# Patient Record
Sex: FEMALE | Race: WHITE | NOT HISPANIC OR LATINO | Employment: PART TIME | URBAN - METROPOLITAN AREA
[De-identification: names, ages, dates, MRNs, and addresses within clinical notes are randomized per-mention and may not be internally consistent; named-entity substitution may affect disease eponyms.]

---

## 2022-08-24 ENCOUNTER — TRANSFERRED RECORDS (OUTPATIENT)
Dept: HEALTH INFORMATION MANAGEMENT | Facility: CLINIC | Age: 37
End: 2022-08-24

## 2022-08-25 ENCOUNTER — TRANSFERRED RECORDS (OUTPATIENT)
Dept: HEALTH INFORMATION MANAGEMENT | Facility: CLINIC | Age: 37
End: 2022-08-25

## 2022-10-05 ENCOUNTER — TRANSFERRED RECORDS (OUTPATIENT)
Dept: HEALTH INFORMATION MANAGEMENT | Facility: CLINIC | Age: 37
End: 2022-10-05

## 2023-03-06 ENCOUNTER — TRANSFERRED RECORDS (OUTPATIENT)
Dept: HEALTH INFORMATION MANAGEMENT | Facility: CLINIC | Age: 38
End: 2023-03-06

## 2023-03-09 ENCOUNTER — TRANSFERRED RECORDS (OUTPATIENT)
Dept: HEALTH INFORMATION MANAGEMENT | Facility: CLINIC | Age: 38
End: 2023-03-09

## 2023-04-06 ENCOUNTER — TRANSFERRED RECORDS (OUTPATIENT)
Dept: HEALTH INFORMATION MANAGEMENT | Facility: CLINIC | Age: 38
End: 2023-04-06

## 2023-04-21 ENCOUNTER — REFERRAL (OUTPATIENT)
Dept: TRANSPLANT | Facility: CLINIC | Age: 38
End: 2023-04-21

## 2023-04-21 DIAGNOSIS — K76.6 PORTAL HYPERTENSION (H): ICD-10-CM

## 2023-04-21 DIAGNOSIS — K70.31 ALCOHOLIC CIRRHOSIS OF LIVER WITH ASCITES (H): Primary | ICD-10-CM

## 2023-04-21 NOTE — LETTER
Chioma VENKAT Alvarado  [address] [place] [address]                May 2, 2023          MEDICAL RECORDS REQUEST  [facility] liver transplant team is requesting records from Referring Providers Office for patients referred to the Liver Transplant Program                Facility: [facility]    Images Needed to Process Intake of Patient:  CXR Images (most recent)  Chest CT Images (all within last 24 months or most recent)  Abdominal CT Report and Images  (all within last 24 months or most recent)  Abdominal MRI Report and Images  (all within last 24 months or most recent)  Bone Scan Images and Report (No DEXA Scans)  PET Images and Report  Records Needed to Process Intake of Patient:  Cardiac Catheterization Results (most recent on file)  Chest CT Report (all within last 24 months or most recent)  Hospital Discharge Summaries (last 2 years on file)  Lab Results (most recent on file)  History and Physical (original on file)  Liver Pathology All Reports   Physicians Notes (last 3 reports on file)  Radiology Reports (not including Chest X-ray, last 2 years on file)  Colonoscopy Report with Pathology    Requested pathology slides should be accompanied by the appropriate report from your institution.    When the patient is hand carrying requested records or the requested records are not at your facility, please indicate this information on a return faxed cover sheet.    Please fax all paper records to [phone] within 1-3 business days.    Please send all scans/slides to:   [facility]  Solid Organ Transplant Office  [address] [place] [address] [address]  [place] [address]    Please call our office at [phone] if you have any questions or concerns.

## 2023-04-21 NOTE — LETTER
September 14, 2023    Chioma Preciadofarzaneh  [address] [facility] [address]    Dear Ms. Alvarado,   The purpose of this letter is to let you know that we will be closing your referral at this time. This is because we are not able to reach you (your phone number on file with us and your referring GI is out of service) and you have not followed up with your appointments as discussed.   Important things you should know:  If you would like to discuss the decision, or if your medical status changes you may schedule a return visits with your doctor by calling [phone] and asking to speak to your transplant coordinator.  We recommend that you continue to follow up with your primary care and referring GI doctor in order to manage your health concerns.  If you are interesting is re-establishing with us, please call your coordinator (see below) to discuss.   We want you to know that our program has physician and surgeon coverage 24 hours a day, 365 days a year. Attached is a letter from [facility] that describes the services and information offered to patients by [facility] and the Organ Procurement and Transplantation Network (OPTN).  Thank you for allowing us to participate in your care.  We wish you well.  Sincerely,    Kj Garcia Jr., SON, RN  Liver Transplant Coordinator  [phone]  Solid Organ Transplant  [facility]    Enclosures: OS Letter  cc: Care Team    The Organ Procurement and Transplantation Network Toll-free patient services line:  [phone]  Your resource for organ transplant information    Staffed 8:30 am - 5:00 pm ET Monday - Friday Leave a message 24/7 to receive a call back    The Organ Procurement and Transplantation Network (OPTN) is the national transplant system. It makes the policies that decide how donated organs are matched to patients waiting for a transplant. The OPTN:    Makes sure donated organs get matched to people on the transplant  waiting list  Tells people about the donation and transplant processes  Makes sure that the public knows about the need for more organ and tissue donations    The OPTN has a free patient services line that you can call to:  Get more information about:  Organ donation and organ transplants  Donation and transplant policies  Get an information kit with:  A list of transplant hospitals  Waiting list information  Talk about any questions you may have about your transplant hospital or organ procurement organization. The staff will do their best to help you or point you to others who may help.  Find out how you can volunteer with the OPTN and help shape transplant policy     The patient services line number is: 5-678-069-4406    Patient services line staff CANNOT answer questions about your own medical care, including:  Waiting list status  Test results  Medical records  You will need to call your transplant hospital for this information.    The following websites have more information about transplantation and donation:    OPTN: https://optn.transplant.hrsa.gov/    For potential living donors and transplant recipients:    Living with transplant: https://www.transplantliving.org/    Living donation process: https://optn.transplant.hrsa.gov/living-donation/    Financial assistance: https://www.livingdonorassistance.org/    Transplantation data: https://www.srtr.org/    Organ donation: https://www.organdonor.gov/    Volunteer with the OPTN: https://optn.transplant.hrsa.gov/get-involved/

## 2023-04-21 NOTE — LETTER
PHYSICIAN ORDERS      DATE & TIME ISSUED: May 11, 2023 11:50 AM  PATIENT NAME: Chioma Alvarado   : 1985     [facility] MR# : [id number]     DIAGNOSIS:  cirrhosis  ICD-10 CODE: K70.31   Orders  1 year after issue.  Please draw once, 3 days before virtual appt w/ Dr. Chavarria (date/time TBD)  These labs must be drawn all together on the same day when done:               INR               Hepatic panel               BMP               CBC w/ platelets               Phosphatidylethanol (PEth)     PLEASE FAX RESULTS AS SOON AS POSSIBLE TO [phone], ATTN:LabDE    FOR CLINICAL QUESTIONS, PLEASE CALL Zion Garcia RN at [phone]    .  Hepatology/Liver Transplant  Medical Director, Liver Transplantation  [facility]

## 2023-04-21 NOTE — LETTER
May 2, 2023     Chioma Alvarado  [address]  [place] [address]          Dear Chioma,    Thank you for your interest in the Transplant Center at [facility]. We look forward to being a part of your care team and assisting you through the transplant process.    As we discussed, your transplant coordinator is Kj Garcia Jr. (Liver).  You may call your coordinator at any time with questions or concerns.  Your first scheduled call will be on 05/11/2023 between 11:00 am 12:00 pm. If you need to reschedule, please call [phone].    Please complete the following.    Fill out and return the enclosed forms  Authorization for Electronic Communication  Authorization to Discuss Protected Health Information  Authorization for Release of Protected Health Information    Sign up for:  "[employer]t, access to your electronic medical record (see enclosed pamphlet)  [employer], a transplant education website    You can use these tools to learn more about your transplant, communicate with your care team, and track your medical details      Sincerely,      Solid Organ Transplant  [facility]    cc: Referring Physician

## 2023-04-25 ENCOUNTER — TRANSFERRED RECORDS (OUTPATIENT)
Dept: HEALTH INFORMATION MANAGEMENT | Facility: CLINIC | Age: 38
End: 2023-04-25
Payer: COMMERCIAL

## 2023-04-26 ENCOUNTER — TRANSFERRED RECORDS (OUTPATIENT)
Dept: HEALTH INFORMATION MANAGEMENT | Facility: CLINIC | Age: 38
End: 2023-04-26
Payer: COMMERCIAL

## 2023-05-02 VITALS — HEIGHT: 67 IN | WEIGHT: 128 LBS | BODY MASS INDEX: 20.09 KG/M2

## 2023-05-02 NOTE — TELEPHONE ENCOUNTER
Patient was asked the following questions during liver intake call.     Referring Provider: PRASAD Koo   Referring Diagnosis: Alcoholic cirrhosis of the liver with ascites   PCP: Patient states same as referring provider     1)Do you know why you have liver disease: Yes       If Alcoholic Cirrhosis is present when was your last drink: 12/2022       Have you ever been through treatment for alcohol: Yes, [facility] 2008 and 2009 in [place], [place].  2) Presence of Ascites: Yes  Paracentesis: Yes   3) Presence of Hepatic Encephalopathy: No  Medications: Nadolol and lasix    4) History of GI Bleeding: No  5) Oxygen Use: No  6) EGD: Yes   7) Colonoscopy: No   8) MELD Score: 22 (03/15/2023)  9) Labs available for review from PCP/GI: Most labs available and scanned from outside   10)HCC Diagnosis: N/A        11)Insurance information:                Policy [place]: self              Subscriber/Policy/ID Number: [facility] Medicaid ID# [id number]             Group Number: [id number]    Referral intake process completed.  Patient is aware that after financial approval is received, medical records will be requested.   Patient confirmed for a callback from transplant coordinator on 05/11/2023.  Tentative evaluation date TBD.    Confirmed coordinator will discuss evaluation process in more detail at the time of their call.   Patient is aware of the need to arrange age appropriate cancer screening, vaccinations, and dental care.    Reminded patient to complete questionnaire, complete medical records release, and review packet prior to evaluation visit .  Assessed patient for special needs (ie--wheelchair, assistance, guardian, and ): None  Patient instructed to call [phone] with questions.     Patient gave verbal consent during intake call to obtain medical records and documents outside of [facility]: Yes

## 2023-05-05 ENCOUNTER — TRANSFERRED RECORDS (OUTPATIENT)
Dept: HEALTH INFORMATION MANAGEMENT | Facility: CLINIC | Age: 38
End: 2023-05-05

## 2023-05-11 PROBLEM — K70.31 ALCOHOLIC CIRRHOSIS OF LIVER WITH ASCITES (H): Status: ACTIVE | Noted: 2023-05-11

## 2023-05-11 NOTE — TELEPHONE ENCOUNTER
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 Dodie Chavarria, labs locally, due to patient's work schedule and location ([place], [place])                 "

## 2023-05-18 NOTE — CONFIDENTIAL NOTE
DIAGNOSIS: Alcoholic cirrhosis of liver with ascites    Appt Date: 07.07.2023   NOTES STATUS DETAILS   OFFICE NOTE from referring provider Internal 04.21.2023 Dodie Chavarria MD   OFFICE NOTES from other specialists Received 05.08.2023 Ross Sanchez MD [facility]   DISCHARGE SUMMARY from hospital     MEDICATION LIST Received 05.08.2023   LIVER BIOSPY (IF APPLICABLE)      PATHOLOGY REPORTS      IMAGING     ENDOSCOPY (IF AVAILABLE)     COLONOSCOPY (IF AVAILABLE)     ULTRASOUND LIVER Received 05.05.2023 US Paracentesis   CT OF ABDOMEN     MRI OF LIVER     FIBROSCAN, US ELASTOGRAPHY, FIBROSIS SCAN, MR ELASTOGRAPHY     LABS     HEPATIC PANEL (LIVER PANEL)     BASIC METABOLIC PANEL     COMPLETE METABOLIC PANEL     COMPLETE BLOOD COUNT (CBC)     INTERNATIONAL NORMALIZED RATIO (INR)     HEPATITIS C ANTIBODY     HEPATITIS C VIRAL LOAD/PCR     HEPATITIS C GENOTYPE     HEPATITIS B SURFACE ANTIGEN     HEPATITIS B SURFACE ANTIBODY     HEPATITIS B DNA QUANT LEVEL     HEPATITIS B CORE ANTIBODY

## 2023-05-23 ENCOUNTER — DOCUMENTATION ONLY (OUTPATIENT)
Dept: TRANSPLANT | Facility: CLINIC | Age: 38
End: 2023-05-23
Payer: COMMERCIAL

## 2023-05-23 NOTE — LETTER
PHYSICIAN ORDERS    DATE & TIME ISSUED: 2023 9:50 AM  PATIENT NAME: Chioma Alvarado   : 1985     [facility] MR# : [id number]     DIAGNOSIS:  cirrhosis  ICD-10 CODE: K70.31   Orders  1 year after issue.  Please have drawn on 23 or 23  These labs must be drawn all together on the same day when done:               INR               Hepatic panel               BMP               CBC w/ platelets               Phosphatidylethanol (PEth)     PLEASE FAX RESULTS AS SOON AS POSSIBLE TO [phone], ATTN:LabDE  FOR CLINICAL QUESTIONS, PLEASE CALL Zion Garcia RN at [phone]    .  Hepatology/Liver Transplant  Medical Director, Liver Transplantation  [facility]

## 2023-05-23 NOTE — LETTER
PHYSICIAN ORDERS    DATE & TIME ISSUED: 2023 9:50 AM  PATIENT NAME: Chioma Alvarado   : 1985     [facility] MR# : [id number]     DIAGNOSIS:  cirrhosis  ICD-10 CODE: K70.31   Orders  1 year after issue.  Please have drawn soon as possible starting 23  These labs must be drawn all together on the same day when done:               INR               Hepatic panel               BMP               CBC w/ platelets               Phosphatidylethanol (PEth)     PLEASE FAX RESULTS AS SOON AS POSSIBLE TO [phone], ATTN:LabDE  FOR CLINICAL QUESTIONS, PLEASE CALL Zion Garcia RN at [phone]    .  Hepatology/Liver Transplant  Medical Director, Liver Transplantation  [facility]

## 2023-06-19 NOTE — TELEPHONE ENCOUNTER
Patient is unable to make 7/7 appt with Dr. Chavarria    I will cancel this slot, and use for another new patient.    This patient will need to be re-sebastian'd for next available new virutal appt slot with Dr. Chavarria.

## 2023-07-07 ENCOUNTER — PRE VISIT (OUTPATIENT)
Dept: GASTROENTEROLOGY | Facility: CLINIC | Age: 38
End: 2023-07-07
Payer: COMMERCIAL

## 2023-09-12 ENCOUNTER — TRANSFERRED RECORDS (OUTPATIENT)
Dept: HEALTH INFORMATION MANAGEMENT | Facility: CLINIC | Age: 38
End: 2023-09-12
Payer: COMMERCIAL

## 2023-09-14 ENCOUNTER — TRANSFERRED RECORDS (OUTPATIENT)
Dept: HEALTH INFORMATION MANAGEMENT | Facility: CLINIC | Age: 38
End: 2023-09-14
Payer: COMMERCIAL

## 2023-09-14 DIAGNOSIS — Z11.59 ENCOUNTER FOR SCREENING FOR OTHER VIRAL DISEASES: ICD-10-CM

## 2023-09-14 DIAGNOSIS — K70.31 ALCOHOLIC CIRRHOSIS OF LIVER WITH ASCITES (H): Primary | ICD-10-CM

## 2023-09-14 NOTE — TELEPHONE ENCOUNTER
Tried calling patient about labs for appt tomorrow with Dr. Chavarria    Phone number is out of service, and no other phone number listed for patient or emergency contact.    Phoned and spoke with referring office, which had same out of service phone number.    Will email patient now as well.     RN from referring clinic to call me to discuss patient.     Will cancel appt tomorrow unless labs are done/received by this evening (9/14).

## 2023-09-14 NOTE — TELEPHONE ENCOUNTER
Requested that sebastian;ing reinstate the appt for tomorrow after speaking with someone in clinic more knowledgeable with patient's cares.    Patient was in GI referring clinic this week and mentioned she was planning on having visit with Dr. Chavarria tomorrow.    Will plan on doing appt tomorrow.     Labs are being faxed down now from clinic.

## 2023-09-20 NOTE — PROGRESS NOTES
Spoke with Chioma    She is checking in with referring MD who is supposed to be calling her today.    She had Na+ adjusted with fluids in ED last week (thurs-Friday)    Plan: ask Dr. Chavarria for video visit soon to establish care

## 2023-09-21 ENCOUNTER — TELEPHONE (OUTPATIENT)
Dept: GASTROENTEROLOGY | Facility: CLINIC | Age: 38
End: 2023-09-21
Payer: COMMERCIAL

## 2023-09-22 ENCOUNTER — TELEPHONE (OUTPATIENT)
Dept: GASTROENTEROLOGY | Facility: CLINIC | Age: 38
End: 2023-09-22
Payer: COMMERCIAL

## 2023-09-22 NOTE — TELEPHONE ENCOUNTER
Received call that patient is unable to be available at 0930 Monday for a video visit with Dr. Chavarria due to her work schedule. Patient can only be available between 11-12 pm. [facility] was able to get another patient on Monday to switch to the 0930 spot and this patient is now scheduled at 11:30, patient is aware of new time.    Nadia DODD LPN  Hepatology Clinic

## 2023-09-24 NOTE — PROGRESS NOTES
[facility] Liver Transplant Evaluation    Requesting Provider and Health System: PRASAD Koo   Referring Diagnosis: Alcohol related cirrhosis with ascites       A/P  Ms. Alvarado is a 37 Y F with decompensated alcohol related cirrhosis with ascites. MELD 18 ABO unk    She has been away from alcohol since December 2022 per her report. She is still requiring paracentesis every 7-10 days and has had issues with diuretics as well as serum sodium.    She states she was told to consume no sodium whatsoever. I discussed with her that the recommendation is to be below 2000 mg and can aim for 1500 mg. She should also maintain fluid restriction of 1.5 L.    I have called Julia Patel's office to discuss the low seium Na from last week (117) and the week prior (112). I do not have a clear idea of what diuretics she is on and what the follow up plan is.    From what information I have, a TIPS would be a reasonable consideration. I do not see that she is headed toward liver transplant with normal TB and INR.    ADDENDUM: Talked to Julia Patel NP after visit. Patient's PETH is greater than 600. Patient will follow up with Julia. Will close evaluation and patient will return her care to Julia Patel NP.    Dodie Chavarria MD  Hepatology/Liver Transplantation  Medical Director, Liver Transplantation  [facility]  ========================================================================    Subjective:  Ms. Alvarado is a 37 Y F with decompensate alcohol related cirrhosis with ascites.    First diagnosed with liver disease around July 2022 when she developed ascites. [place] and noted fluid retention and she had vomiting. Eventually she was seen in August 2022.     MELD 18 from labs 9/14/23 with Na 117. She went to ED and was given some IV fluids (not clear what happened). She was supposed to stay overnight for treatment but could not stay. No labs since then.  MELD 22 on 3/15/23    AUD  Last ETOH  December 2022  CD rx: last was 2009  Typical pattern was drinking more during football season (beer). Not sure what quantity was. Drank vodka cranberry when she     Ascites   spironolactone thinks she is on 100 mg  furosemide was on it and taken off due to rash.  Na restricted diet follows low Na. Thinks she is doing well in the 2850-6332 mg  paracentesis was every 10 d now weekly with around 3-6L    HE  none    HCC screening no records.  EGD 11/2022. Had varices. No records. 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  Exam  Gen Alert pleasant NAD  Resp No difficulty breathing. No cough  Skin No Jaundice  Eyes No icterus  Neuro THORPE  MSK no muscle wasting  Psyche Pleasant, appropriate. Well groomed.  Chioma Alvarado is a 37 year old female who is being evaluated via a billable video visit.    Video-Visit Details  Type of service:  Video Visit  Video Start Time: 1135  Video End Time: 1202  Originating Location (pt. Location):home  Distant Location (provider location):  [facility] [place]      Platform used for Video Visit: [employer] or [employer]      Time today in minutes 72  Record review 20  Communication with care team 20  Face to face with patient on video 27  Documentation 15

## 2023-09-25 ENCOUNTER — VIRTUAL VISIT (OUTPATIENT)
Dept: GASTROENTEROLOGY | Facility: CLINIC | Age: 38
End: 2023-09-25
Attending: INTERNAL MEDICINE
Payer: COMMERCIAL

## 2023-09-25 DIAGNOSIS — K70.31 ALCOHOLIC CIRRHOSIS OF LIVER WITH ASCITES (H): Primary | ICD-10-CM

## 2023-09-25 DIAGNOSIS — Z11.59 ENCOUNTER FOR SCREENING FOR OTHER VIRAL DISEASES: ICD-10-CM

## 2023-09-25 PROCEDURE — 99205 OFFICE O/P NEW HI 60 MIN: CPT | Mod: VID | Performed by: INTERNAL MEDICINE

## 2023-09-25 RX ORDER — LORATADINE 10 MG/1
10 TABLET ORAL
COMMUNITY

## 2023-09-25 RX ORDER — ALBUTEROL SULFATE 90 UG/1
AEROSOL, METERED RESPIRATORY (INHALATION)
COMMUNITY
Start: 2023-03-31

## 2023-09-25 NOTE — PROGRESS NOTES
"Virtual Visit Details    Type of service:  Video Visit   Video Start Time: {video visit start/end time for provider to select:315144}  Video End Time:{video visit start/end time for provider to select:664787}    Originating Location (pt. Location): {video visit patient location:023328::\"Home\"}  {PROVIDER LOCATION On-site should be selected for visits conducted from your clinic location or adjoining [facility] hospital, academic office, or other nearby [facility] building. Off-site should be selected for all other provider locations, including home:062852}  Distant Location (provider location):  {virtual location provider:964807}  Platform used for Video Visit: {Virtual Visit Platforms:226689::\"WorldState\"}    "

## 2023-09-25 NOTE — LETTER
9/25/2023         RE: Chioma Alvarado  [address] [address]        Dear Colleague,    Thank you for referring your patient, Chioma Alvarado, to the [facility] [place]. Please see a copy of my visit note below.    [facility] Liver Transplant Evaluation    Requesting Provider and Health System: PRASAD Koo   Referring Diagnosis: Alcohol related cirrhosis with ascites       A/P  Ms. Alvarado is a 37 Y F with decompensated alcohol related cirrhosis with ascites. MELD 18 ABO unk    She has been away from alcohol since December 2022 per her report. She is still requiring paracentesis every 7-10 days and has had issues with diuretics as well as serum sodium.    She states she was told to consume no sodium whatsoever. I discussed with her that the recommendation is to be below 2000 mg and can aim for 1500 mg. She should also maintain fluid restriction of 1.5 L.    I have called Julia Patel's office to discuss the low seium Na from last week (117) and the week prior (112). I do not have a clear idea of what diuretics she is on and what the follow up plan is.    From what information I have, a TIPS would be a reasonable consideration. I do not see that she is headed toward liver transplant with normal TB and INR.    ADDENDUM: Talked to Julia Patel NP after visit. Patient's PETH is greater than 600. Patient will follow up with Julia. Will close evaluation and patient will return her care to Julia Patel NP.    Dodie Chavarria MD  Hepatology/Liver Transplantation  Medical Director, Liver Transplantation  [facility]  ========================================================================    Subjective:  Ms. Alvarado is a 37 Y F with decompensate alcohol related cirrhosis with ascites.    First diagnosed with liver disease around July 2022 when she developed ascites. She was at a state fair and noted fluid retention and she had vomiting. Eventually she was seen in  August 2022.     MELD 18 from labs 9/14/23 with Na 117. She went to ED and was given some IV fluids (not clear what happened). She was supposed to stay overnight for treatment but could not stay. No labs since then.  MELD 22 on 3/15/23    AUD  Last ETOH December 2022  CD rx: last was 2009  Typical pattern was drinking more during football season (beer). Not sure what quantity was. Drank vodka cranberry when she     Ascites   spironolactone thinks she is on 100 mg  furosemide was on it and taken off due to rash.  Na restricted diet follows low Na. Thinks she is doing well in the 6464-4183 mg  paracentesis was every 10 d now weekly with around 3-6L    HE  none    HCC screening no records.  EGD 11/2022. Had varices. No records. 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  Exam  Gen Alert pleasant NAD  Resp No difficulty breathing. No cough  Skin No Jaundice  Eyes No icterus  Neuro THORPE  MSK no muscle wasting  Psyche Pleasant, appropriate. Well groomed.  Chioma Alvarado is a 37 year old female who is being evaluated via a billable video visit.    Video-Visit Details  Type of service:  Video Visit  Video Start Time: 1135  Video End Time: 1202  Originating Location (pt. Location):home  Distant Location (provider location):  [facility] [place]      Platform used for Video Visit: [employer] or [email or web address]      Time today in minutes 72  Record review 20  Communication with care team 20  Face to face with patient on video 27  Documentation 15                    Again, thank you for allowing me to  participate in the care of your patient.        Sincerely,        Dodie Chavarria MD

## 2023-09-25 NOTE — NURSING NOTE
Is the patient currently in the state of [place]? NO    Visit mode:VIDEO    If the visit is dropped, the patient can be reconnected by: VIDEO VISIT: Text to cell phone:   Telephone Information:   Mobile [phone]       Will anyone else be joining the visit? NO  (If patient encounters technical issues they should call [phone] :[phone])    How would you like to obtain your AVS? Mail a copy    Are changes needed to the allergy or medication list? No    Reason for visit: No chief complaint on file.    Nishi BASSETTF

## 2023-12-07 ENCOUNTER — DOCUMENTATION ONLY (OUTPATIENT)
Dept: TRANSPLANT | Facility: CLINIC | Age: 38
End: 2023-12-07
Payer: COMMERCIAL

## 2023-12-07 NOTE — PROGRESS NOTES
Phone call from referring provider, Julia Patel at [place], [place], who informed me that patient passed.    No details given.